# Patient Record
Sex: FEMALE | Race: BLACK OR AFRICAN AMERICAN | NOT HISPANIC OR LATINO | URBAN - METROPOLITAN AREA
[De-identification: names, ages, dates, MRNs, and addresses within clinical notes are randomized per-mention and may not be internally consistent; named-entity substitution may affect disease eponyms.]

---

## 2018-08-02 ENCOUNTER — EMERGENCY (EMERGENCY)
Facility: HOSPITAL | Age: 48
LOS: 1 days | Discharge: HOME | End: 2018-08-02
Admitting: EMERGENCY MEDICINE
Payer: MEDICAID

## 2018-08-02 VITALS
TEMPERATURE: 98 F | SYSTOLIC BLOOD PRESSURE: 156 MMHG | DIASTOLIC BLOOD PRESSURE: 100 MMHG | RESPIRATION RATE: 16 BRPM | OXYGEN SATURATION: 100 % | HEART RATE: 87 BPM

## 2018-08-02 VITALS
DIASTOLIC BLOOD PRESSURE: 121 MMHG | HEART RATE: 83 BPM | OXYGEN SATURATION: 100 % | RESPIRATION RATE: 15 BRPM | SYSTOLIC BLOOD PRESSURE: 169 MMHG

## 2018-08-02 LAB
ALBUMIN SERPL ELPH-MCNC: 3.9 G/DL — SIGNIFICANT CHANGE UP (ref 3.3–5)
ALP SERPL-CCNC: 61 U/L — SIGNIFICANT CHANGE UP (ref 40–120)
ALT FLD-CCNC: 24 U/L — SIGNIFICANT CHANGE UP (ref 4–33)
AST SERPL-CCNC: 41 U/L — HIGH (ref 4–32)
BASOPHILS # BLD AUTO: 0.03 K/UL — SIGNIFICANT CHANGE UP (ref 0–0.2)
BASOPHILS NFR BLD AUTO: 0.5 % — SIGNIFICANT CHANGE UP (ref 0–2)
BILIRUB SERPL-MCNC: 0.3 MG/DL — SIGNIFICANT CHANGE UP (ref 0.2–1.2)
BUN SERPL-MCNC: 13 MG/DL — SIGNIFICANT CHANGE UP (ref 7–23)
CALCIUM SERPL-MCNC: 9.2 MG/DL — SIGNIFICANT CHANGE UP (ref 8.4–10.5)
CHLORIDE SERPL-SCNC: 101 MMOL/L — SIGNIFICANT CHANGE UP (ref 98–107)
CO2 SERPL-SCNC: 22 MMOL/L — SIGNIFICANT CHANGE UP (ref 22–31)
CREAT SERPL-MCNC: 0.87 MG/DL — SIGNIFICANT CHANGE UP (ref 0.5–1.3)
EOSINOPHIL # BLD AUTO: 0.1 K/UL — SIGNIFICANT CHANGE UP (ref 0–0.5)
EOSINOPHIL NFR BLD AUTO: 1.8 % — SIGNIFICANT CHANGE UP (ref 0–6)
GLUCOSE SERPL-MCNC: 92 MG/DL — SIGNIFICANT CHANGE UP (ref 70–99)
HCG SERPL-ACNC: < 5 MIU/ML — SIGNIFICANT CHANGE UP
HCT VFR BLD CALC: 39.2 % — SIGNIFICANT CHANGE UP (ref 34.5–45)
HGB BLD-MCNC: 13.1 G/DL — SIGNIFICANT CHANGE UP (ref 11.5–15.5)
IMM GRANULOCYTES # BLD AUTO: 0.02 # — SIGNIFICANT CHANGE UP
IMM GRANULOCYTES NFR BLD AUTO: 0.4 % — SIGNIFICANT CHANGE UP (ref 0–1.5)
LYMPHOCYTES # BLD AUTO: 1.99 K/UL — SIGNIFICANT CHANGE UP (ref 1–3.3)
LYMPHOCYTES # BLD AUTO: 36.2 % — SIGNIFICANT CHANGE UP (ref 13–44)
MCHC RBC-ENTMCNC: 30.3 PG — SIGNIFICANT CHANGE UP (ref 27–34)
MCHC RBC-ENTMCNC: 33.4 % — SIGNIFICANT CHANGE UP (ref 32–36)
MCV RBC AUTO: 90.5 FL — SIGNIFICANT CHANGE UP (ref 80–100)
MONOCYTES # BLD AUTO: 0.3 K/UL — SIGNIFICANT CHANGE UP (ref 0–0.9)
MONOCYTES NFR BLD AUTO: 5.5 % — SIGNIFICANT CHANGE UP (ref 2–14)
NEUTROPHILS # BLD AUTO: 3.06 K/UL — SIGNIFICANT CHANGE UP (ref 1.8–7.4)
NEUTROPHILS NFR BLD AUTO: 55.6 % — SIGNIFICANT CHANGE UP (ref 43–77)
NRBC # FLD: 0 — SIGNIFICANT CHANGE UP
PLATELET # BLD AUTO: 273 K/UL — SIGNIFICANT CHANGE UP (ref 150–400)
PMV BLD: 9.3 FL — SIGNIFICANT CHANGE UP (ref 7–13)
POTASSIUM SERPL-MCNC: 4.8 MMOL/L — SIGNIFICANT CHANGE UP (ref 3.5–5.3)
POTASSIUM SERPL-SCNC: 4.8 MMOL/L — SIGNIFICANT CHANGE UP (ref 3.5–5.3)
PROT SERPL-MCNC: 7.8 G/DL — SIGNIFICANT CHANGE UP (ref 6–8.3)
RBC # BLD: 4.33 M/UL — SIGNIFICANT CHANGE UP (ref 3.8–5.2)
RBC # FLD: 13 % — SIGNIFICANT CHANGE UP (ref 10.3–14.5)
SODIUM SERPL-SCNC: 136 MMOL/L — SIGNIFICANT CHANGE UP (ref 135–145)
WBC # BLD: 5.5 K/UL — SIGNIFICANT CHANGE UP (ref 3.8–10.5)
WBC # FLD AUTO: 5.5 K/UL — SIGNIFICANT CHANGE UP (ref 3.8–10.5)

## 2018-08-02 PROCEDURE — 99285 EMERGENCY DEPT VISIT HI MDM: CPT | Mod: 25

## 2018-08-02 PROCEDURE — 93010 ELECTROCARDIOGRAM REPORT: CPT

## 2018-08-02 PROCEDURE — 70450 CT HEAD/BRAIN W/O DYE: CPT | Mod: 26

## 2018-08-02 RX ORDER — CLONAZEPAM 1 MG
0.5 TABLET ORAL ONCE
Qty: 0 | Refills: 0 | Status: DISCONTINUED | OUTPATIENT
Start: 2018-08-02 | End: 2018-08-02

## 2018-08-02 RX ORDER — MECLIZINE HCL 12.5 MG
25 TABLET ORAL ONCE
Qty: 0 | Refills: 0 | Status: COMPLETED | OUTPATIENT
Start: 2018-08-02 | End: 2018-08-02

## 2018-08-02 RX ORDER — SODIUM CHLORIDE 9 MG/ML
2000 INJECTION INTRAMUSCULAR; INTRAVENOUS; SUBCUTANEOUS ONCE
Qty: 0 | Refills: 0 | Status: COMPLETED | OUTPATIENT
Start: 2018-08-02 | End: 2018-08-02

## 2018-08-02 RX ORDER — MECLIZINE HCL 12.5 MG
1 TABLET ORAL
Qty: 15 | Refills: 0 | OUTPATIENT
Start: 2018-08-02 | End: 2018-08-06

## 2018-08-02 RX ORDER — METOCLOPRAMIDE HCL 10 MG
10 TABLET ORAL ONCE
Qty: 0 | Refills: 0 | Status: COMPLETED | OUTPATIENT
Start: 2018-08-02 | End: 2018-08-02

## 2018-08-02 RX ADMIN — SODIUM CHLORIDE 2000 MILLILITER(S): 9 INJECTION INTRAMUSCULAR; INTRAVENOUS; SUBCUTANEOUS at 17:40

## 2018-08-02 RX ADMIN — Medication 10 MILLIGRAM(S): at 17:40

## 2018-08-02 RX ADMIN — Medication 25 MILLIGRAM(S): at 17:40

## 2018-08-02 RX ADMIN — SODIUM CHLORIDE 2000 MILLILITER(S): 9 INJECTION INTRAMUSCULAR; INTRAVENOUS; SUBCUTANEOUS at 19:30

## 2018-08-02 RX ADMIN — Medication 0.5 MILLIGRAM(S): at 19:42

## 2018-08-02 NOTE — CONSULT NOTE ADULT - SUBJECTIVE AND OBJECTIVE BOX
Neurology Consult Note    Name  GRACIELA NICE    HPI:  48 y/o woman, current cigarette smoker 1 PPD with no significant PMH c/o vertigo x 3 days. Symptoms started acutely upon waking up on 7/31. Vertigo is present only when sitting up or standing, not present when laying flat. Associated with R ear fullness and decreased hearing, also since 7/31. Had nausea and vomiting on the first day of symptoms, no longer present. No diplopia, blurred vision, headache, focal numbness or weakness, difficulty with speech. Pt has some difficulty with ambulation when her symptoms are present. She presented to [facility] ER where she received an Rx for meclizine but has not been able to fill it due to insurance issues. Currently she feels like her symptoms are improved s/p IVF, reglan, and meclizine but not back to baseline. No prior history of symptoms like this.    NIHSS 0 MRS 0    Review of Systems:  CONSTITUTIONAL:  No weight loss, fever, chills, weakness or fatigue.  HEENT:  Eyes:  No visual loss, blurred vision, double vision or yellow sclerae. Ears, Nose, Throat:  No hearing loss, sneezing, congestion, runny nose or sore throat.  SKIN:  No rash or itching.  CARDIOVASCULAR:  No chest pain, chest pressure or chest discomfort. No palpitations or edema.  RESPIRATORY:  No shortness of breath, cough or sputum.  GASTROINTESTINAL:  As above  GENITOURINARY: No Burning on urination.   NEUROLOGICAL:  see HPI  MUSCULOSKELETAL:  No muscle, back pain, joint pain or stiffness.  HEMATOLOGIC:  No anemia, bleeding or bruising.  LYMPHATICS:  No enlarged nodes. No history of splenectomy.  PSYCHIATRIC:  No history of depression or anxiety.  ENDOCRINOLOGIC:  No reports of sweating, cold or heat intolerance. No polyuria or polydipsia.  ALLERGIES:  No history of asthma, hives, eczema or rhinitis.    MEDICATIONS  (STANDING):  None    Allergies  No Known Allergies    PAST MEDICAL & SURGICAL HISTORY:  No pertinent past medical history  No significant past surgical history    FH: NC    SH: Current cigarette smoker, occasional social EtOH use. Denies other substance use.     Objective:   Vital Signs Last 24 Hrs  T(C): 36.8 (02 Aug 2018 16:33), Max: 36.8 (02 Aug 2018 16:33)  T(F): 98.2 (02 Aug 2018 16:33), Max: 98.2 (02 Aug 2018 16:33)  HR: 83 (02 Aug 2018 19:20) (83 - 87)  BP: 169/121 (02 Aug 2018 19:20) (156/100 - 169/121)  RR: 15 (02 Aug 2018 19:20) (15 - 16)  SpO2: 100% (02 Aug 2018 19:20) (100% - 100%)    General Exam:   General appearance: No acute distress  Head impulse: Positive- Corrective saccade present on head turn to left                    Neurological Exam:  Mental Status: AAOx3, speech fluent, follows commands    Cranial Nerves: PERRL, L eye laterally deviated on primary gaze (since childhood) however EOMI during pursuit, VFF, no nystagmus but corrective saccade was present on head impulse test. CN V1-3 intact to light touch and pinprick.  No facial asymmetry.  Decreased hearing to finger rub on right.  Tongue, uvula and palate midline.  Sternocleidomastoid and Trapezius intact bilaterally.    Motor:   Tone: normal.                  Strength:     [] Upper extremity                      Delt       Bicep    Tricep                                                  R         5/5        5/5        5/5       5/5                                               L          5/5        5/5        5/5       5/5  [] Lower extremity                       HF          KE          KF        DF         PF                                               R        5/5        5/5        5/5       5/5       5/5                                               L         5/5        5/5       5/5       5/5        5/5  Pronator drift: none                 Tremor: No resting, postural or action tremor.  No myoclonus.    Sensation: intact to light touch x 4 extremities. No extinction on DSS.    Coord: No ataxia or dysmetria on FTN or HTS x 4 extremities. No dysdiadochokinesia.       Other:                        13.1   5.50  )-----------( 273      ( 02 Aug 2018 17:30 )             39.2     08-02    136  |  101  |  13  ----------------------------<  92  4.8   |  22  |  0.87    Ca    9.2      02 Aug 2018 17:30    TPro  7.8  /  Alb  3.9  /  TBili  0.3  /  DBili  x   /  AST  41<H>  /  ALT  24  /  AlkPhos  61  08-02    LIVER FUNCTIONS - ( 02 Aug 2018 17:30 )  Alb: 3.9 g/dL / Pro: 7.8 g/dL / ALK PHOS: 61 u/L / ALT: 24 u/L / AST: 41 u/L / GGT: x           Radiology    CTH pending

## 2018-08-02 NOTE — ED PROVIDER NOTE - PLAN OF CARE
Rest, drink plenty of fluids. Meclizine has been sent to your pharmacy, take this medicine as prescribed. Follow up with a neurologist, referral is provided in your discharge packet.  Advance activity as tolerated.  Continue all previously prescribed medications as directed.  Follow up with your primary care physician in 48-72 hours- bring copies of your results.  Return to the ER for worsening or persistent symptoms, and/or ANY NEW OR CONCERNING SYMPTOMS. If you have issues obtaining follow up, please call: [phone] to obtain a doctor or specialist who takes your insurance in your area.  You may call [phone] to make an appointment with the internal medicine clinic.

## 2018-08-02 NOTE — ED PROVIDER NOTE - MEDICAL DECISION MAKING DETAILS
Patient C/O dizz and occasional ringing in R ear, denies trauma or specific injury. Exam normal, no cerumen or otitis media. Patient improved on oral medication. Safe for D/C with outpatient F/U.

## 2018-08-02 NOTE — ED ADULT TRIAGE NOTE - CHIEF COMPLAINT QUOTE
Pt c/o dizziness x3 days and can't hear out of right ear.  Says it feels like there's fluid in her ears.  Denies any recent illnesses or fever.  Denies CP

## 2018-08-02 NOTE — CONSULT NOTE ADULT - ASSESSMENT
48 y/o woman c/o positional vertigo since 7/31 associated with decreased hearing and ear fullness on the right. Neurologic exam reveals no focal deficits and is significant for a positive head impulse test. CTH is pending.     Impression: Symptoms and exam findings are most consistent with a peripheral vertigo, ie vestibular neuronitis or Meniere's. Positive head impulse test is also reassuring that symptoms are more likely peripheral in etiology.     Recommend:  - F/u CTH  - If no acute pathology on CTH, recommend outpatient neurology follow up at [facility] ambulatory clinic   - C/w symptomatic management 48 y/o woman c/o positional vertigo since 7/31 associated with decreased hearing and ear fullness on the right. Neurologic exam reveals no focal deficits and is significant for a positive head impulse test. CTH is pending.     Impression: Symptoms and exam findings are most consistent with a peripheral vertigo, ie vestibular neuronitis or Meniere's. Positive head impulse test is also reassuring that symptoms are more likely peripheral in etiology.     Recommend:  - F/u CTH which was negative  - If no acute pathology on CTH, recommend outpatient neurology follow up at [facility] ambulatory clinic   - C/w symptomatic management

## 2018-08-02 NOTE — ED PROVIDER NOTE - OBJECTIVE STATEMENT
48 Y/O F denies PMH or PSH, admits to smoking and occasional tobacco use c/O 3 days of dizz and reduced acuity of the R ear. Denies any other 48 Y/O F denies PMH or PSH, admits to smoking and occasional tobacco use c/O 3 days of dizz and reduced acuity of the R ear. Denies trauma. Denies any other symptoms or complaints. States she was seen at another for her symptoms two days ago and a medication for her symptoms were sent to her pharmacy but she states her insurance isnt active. She denies any other symptoms or complaints such as fever, chills, neck pain, CP SOB N V D syncope, palpitations or any other acute changes. Patient states it is a "room spinning" dizziness.

## 2018-08-02 NOTE — ED PROVIDER NOTE - PROGRESS NOTE DETAILS
ROSITA Isabel; Patient has been ambulatory, notes improved symptoms after medication, neurology service feels patient's etiology is peripheral. Pt to be d/c'd with outpatient f/u.

## 2018-08-02 NOTE — CONSULT NOTE ADULT - NSHPATTENDINGPLANDISCUSS_GEN_ALL_CORE
NC to room air O2 sats 97-98%  Maintain O2 sat >92  Prn CXR/ABG   neurology resident as above and I agree with assessment and plan and outpatient follow up.

## 2018-08-02 NOTE — ED PROVIDER NOTE - CARE PLAN
Principal Discharge DX:	Vertigo Principal Discharge DX:	Vertigo  Assessment and plan of treatment:	Rest, drink plenty of fluids. Meclizine has been sent to your pharmacy, take this medicine as prescribed. Follow up with a neurologist, referral is provided in your discharge packet.  Advance activity as tolerated.  Continue all previously prescribed medications as directed.  Follow up with your primary care physician in 48-72 hours- bring copies of your results.  Return to the ER for worsening or persistent symptoms, and/or ANY NEW OR CONCERNING SYMPTOMS. If you have issues obtaining follow up, please call: [phone] to obtain a doctor or specialist who takes your insurance in your area.  You may call [phone] to make an appointment with the internal medicine clinic.